# Patient Record
(demographics unavailable — no encounter records)

---

## 2025-03-18 NOTE — CONSULT LETTER
[Dear  ___] : Dear  [unfilled], [Consult Letter:] : I had the pleasure of evaluating your patient, [unfilled]. [Please see my note below.] : Please see my note below. [Consult Closing:] : Thank you very much for allowing me to participate in the care of this patient.  If you have any questions, please do not hesitate to contact me. [Sincerely,] : Sincerely, [FreeTextEntry3] : Umair Butt MD [facility] [address] [address] Phone: [phone] Fax: [phone]

## 2025-03-18 NOTE — HISTORY OF PRESENT ILLNESS
[Stable] : stable [FreeTextEntry1] : 10-year-old female presents with her mother for evaluation of right foot pain which has been present for more than 3 months.  She denies any specific injury that started the pain.  She was involved in gymnastics at the time but no injury reported.  The pain is present with limited distance walking.  The patient has not noticed any swelling to the area.  Standing in 1 position can also cause discomfort.  She has not been playing gym class because of the pain.  She is not taking any pain medication. Pain is localized to the medial aspect of the foot/great toe region.

## 2025-03-18 NOTE — REVIEW OF SYSTEMS
[Menarche] : ~T menarche [Joint Pains] : arthralgias [Appropriate Age Development] : development appropriate for age [Change in Activity] : no change in activity [Rash] : no rash [Heart Problems] : no heart problems [Congestion] : no congestion [Limping] : no limping [Joint Swelling] : no joint swelling [Sleep Disturbances] : ~T no sleep disturbances

## 2025-03-18 NOTE — ASSESSMENT
[FreeTextEntry1] : Pre hallux valgus right greater than left Right great toe pain/capsulitis  The history for today's visit was obtained from the child, as well as the parent. The child's history was unreliable alone due to age and therefore, the parent was used today as an independent historian.  3 views of the right foot today in the office reveal good overall alignment. No fracture or dislocation. No lesions noted. Sesamoids is good position. No fracture. The clinical exam and xrays were discussed with mother and patient. She should try a spacer for the great toe as well as a course of NSAIDS for the next 5-7 days. If there is no improvement it is recommended that she f/u with Dr. Cordova, foot and ankle specialist, for evaluation. Activity as tolerated. All questions answered. Parent in agreement with the plan.  ILittle, MPAS, PAC, have acted as a scribe and documented the above for Dr. Butt.   The above documentation completed by the PA is an accurate record of both my words and actions. Umair Butt MD.  This note was generated using Dragon medical dictation software.  A reasonable effort has been made for proofreading its contents, but typos may still remain.  If there are any questions or points of clarification needed please do not hesitate to contact my office.

## 2025-03-18 NOTE — PHYSICAL EXAM
[FreeTextEntry1] : GAIT: No limp. Good coordination and balance noted. GENERAL: alert, cooperative pleasant young 15 yo female in NAD SKIN: The skin is intact, warm, pink and dry over the area examined. EYES: Normal conjunctiva, normal eyelids and pupils were equal and round. ENT: normal ears, normal nose and normal lips. CARDIOVASCULAR: brisk capillary refill, but no peripheral edema. RESPIRATORY: The patient is in no apparent respiratory distress. They're taking full deep breaths without use of accessory muscles or evidence of audible wheezes or stridor without the use of a stethoscope. Normal respiratory effort. ABDOMEN: not examined   right foot: mild pre hallux valgus noted right more than left. Mild tenderness over the MTP joint noted great toe right. no tenderness over other MTP joints noted.  FUll ROM. No tenderness with great toe ROM. Mild tenderness with abduction of the great toe. No instability to stress Brisk cap refill sensation grossly intact

## 2025-03-18 NOTE — DATA REVIEWED
[de-identified] : 3 views of the right foot today in the office reveal good overall alignment. No fracture or dislocation. No lesions noted. Sesamoids is good position. No fracture.

## 2025-03-18 NOTE — ASSESSMENT
[FreeTextEntry1] : Pre hallux valgus right greater than left Right great toe pain/capsulitis  The history for today's visit was obtained from the child, as well as the parent. The child's history was unreliable alone due to age and therefore, the parent was used today as an independent historian.  3 views of the right foot today in the office reveal good overall alignment. No fracture or dislocation. No lesions noted. Sesamoids is good position. No fracture. The clinical exam and xrays were discussed with mother and patient. She should try a spacer for the great toe as well as a course of NSAIDS for the next 5-7 days. If there is no improvement it is recommended that she f/u with Dr. Codrova, foot and ankle specialist, for evaluation. Activity as tolerated. All questions answered. Parent in agreement with the plan.  ILittle, MPAS, PAC, have acted as a scribe and documented the above for Dr. Butt.   The above documentation completed by the PA is an accurate record of both my words and actions. Umair Butt MD.  This note was generated using Dragon medical dictation software.  A reasonable effort has been made for proofreading its contents, but typos may still remain.  If there are any questions or points of clarification needed please do not hesitate to contact my office.

## 2025-03-18 NOTE — CONSULT LETTER
[Dear  ___] : Dear  [unfilled], [Consult Letter:] : I had the pleasure of evaluating your patient, [unfilled]. [Please see my note below.] : Please see my note below. [Consult Closing:] : Thank you very much for allowing me to participate in the care of this patient.  If you have any questions, please do not hesitate to contact me. [Sincerely,] : Sincerely, [FreeTextEntry3] : Umair Butt MD [facility] [address] [address] Phone: [phone] Fax: [phone]

## 2025-05-23 NOTE — ASSESSMENT
[FreeTextEntry1] : Diagnosis: Right wrist pain.  The history was obtained today from the child and parent; given the patient's age and/or the child's mental capacity, the history was unreliable and the parent was used as an independent historian.  This is a healthy almost 16-year-old young woman with a 4 to 5-week history of the above.  Her exam is unremarkable today except for some discomfort on the dorsum of her carpals.  X-rays are within normal limits.  I have a long conversation with her mother.  This may well be the beginning the early stages of a ganglion cyst.  The discomfort is usually temporary and occurs when performing flexion activities of the wrist.  She is recommended activity modification observation as well as an elastic wrist band.  Activities as tolerated.  Mother is told to contact the office should the symptoms persist or worsen.  All of the mother's questions were addressed. She understood and agreed with the plan.

## 2025-05-23 NOTE — PHYSICAL EXAM
[FreeTextEntry1] : Alert, comfortable, well-developed, in no apparent distress, well-oriented x3, almost 16-year-old young woman.  She is right-hand dominant.  She points to the dorsum of her left carpus as the source of the pain.  There is no swelling, deformities or bruises.  Some tenderness to palpation of the dorsum of her carpus.  Pain increases with passive flexion of the wrist.  No clear signs of a dorsal ganglion cyst although this cannot be completely ruled out.  Normal muscle strength.  No pain or instability at the DRUJ.  Exam of the rest of her fingers and forearm is unremarkable.
